# Patient Record
Sex: FEMALE | Race: WHITE | NOT HISPANIC OR LATINO
[De-identification: names, ages, dates, MRNs, and addresses within clinical notes are randomized per-mention and may not be internally consistent; named-entity substitution may affect disease eponyms.]

---

## 2021-02-12 PROBLEM — Z00.00 ENCOUNTER FOR PREVENTIVE HEALTH EXAMINATION: Status: ACTIVE | Noted: 2021-02-12

## 2021-03-04 ENCOUNTER — APPOINTMENT (OUTPATIENT)
Dept: OBGYN | Facility: CLINIC | Age: 41
End: 2021-03-04

## 2021-03-04 ENCOUNTER — APPOINTMENT (OUTPATIENT)
Dept: OBGYN | Facility: CLINIC | Age: 41
End: 2021-03-04
Payer: COMMERCIAL

## 2021-03-04 DIAGNOSIS — N64.89 OTHER SPECIFIED DISORDERS OF BREAST: ICD-10-CM

## 2021-03-04 DIAGNOSIS — N64.52 NIPPLE DISCHARGE: ICD-10-CM

## 2021-03-04 DIAGNOSIS — Z01.419 ENCOUNTER FOR GYNECOLOGICAL EXAMINATION (GENERAL) (ROUTINE) W/OUT ABNORMAL FINDINGS: ICD-10-CM

## 2021-03-04 DIAGNOSIS — R92.2 INCONCLUSIVE MAMMOGRAM: ICD-10-CM

## 2021-03-04 PROCEDURE — 36415 COLL VENOUS BLD VENIPUNCTURE: CPT

## 2021-03-04 PROCEDURE — 99072 ADDL SUPL MATRL&STAF TM PHE: CPT

## 2021-03-04 PROCEDURE — 99385 PREV VISIT NEW AGE 18-39: CPT

## 2021-03-04 PROCEDURE — 99202 OFFICE O/P NEW SF 15 MIN: CPT | Mod: 25

## 2021-03-06 LAB
BASOPHILS # BLD AUTO: 0.06 K/UL
BASOPHILS NFR BLD AUTO: 0.8 %
EOSINOPHIL # BLD AUTO: 0.12 K/UL
EOSINOPHIL NFR BLD AUTO: 1.6 %
FSH SERPL-MCNC: 3.7 IU/L
HCT VFR BLD CALC: 44.2 %
HGB BLD-MCNC: 13.8 G/DL
HPV HIGH+LOW RISK DNA PNL CVX: NOT DETECTED
IMM GRANULOCYTES NFR BLD AUTO: 0.3 %
LH SERPL-ACNC: 3.1 IU/L
LYMPHOCYTES # BLD AUTO: 2.5 K/UL
LYMPHOCYTES NFR BLD AUTO: 32.6 %
MAN DIFF?: NORMAL
MCHC RBC-ENTMCNC: 30.1 PG
MCHC RBC-ENTMCNC: 31.2 GM/DL
MCV RBC AUTO: 96.5 FL
MONOCYTES # BLD AUTO: 0.62 K/UL
MONOCYTES NFR BLD AUTO: 8.1 %
NEUTROPHILS # BLD AUTO: 4.34 K/UL
NEUTROPHILS NFR BLD AUTO: 56.6 %
PLATELET # BLD AUTO: 313 K/UL
PROLACTIN SERPL-MCNC: 13 NG/ML
RBC # BLD: 4.58 M/UL
RBC # FLD: 13.2 %
TSH SERPL-ACNC: 0.95 UIU/ML
WBC # FLD AUTO: 7.66 K/UL

## 2021-03-06 NOTE — END OF VISIT
[FreeTextEntry3] : I, Maru Earl, solely acted as a scribe for Dr. Shaina Nice on 03/04/2021  . All medical entries made by the Scribe were at my, Dr. Nice, direction and personally dictated by me on 03/04/2021  . I have reviewed the chart and agree that the record accurately reflects my personal performance of the history, physical exam, assessment and plan. I have also personally directed, reviewed and agreed with the chart.

## 2021-03-06 NOTE — HISTORY OF PRESENT ILLNESS
[N] : Patient reports normal menses [No] : Patient does not have concerns regarding sex [Currently Active] : currently active [Men] : men [Y] : Positive pregnancy history [Menarche Age: ____] : age at menarche was [unfilled] [Yes] : Yes [Condoms] : Condoms [TextBox_4] : New pt presents for an annual exam [PapSmeardate] : 2018 [TextBox_31] : AS PER PT [LMPDate] : 02/11/2021 [PGxTotal] : 1 [[facility]xFulerm] : 1 [[facility]] : 2 [PGHxMultBirths] : 1 [FreeTextEntry1] : 02/11/2021

## 2021-03-06 NOTE — DISCUSSION/SUMMARY
[FreeTextEntry1] : Pap done today. Declines birth control. Declines STI testing.\par \par Physical exam significant for bilateral nipple clear discharge with crusting-- Blood work ordered to evaluate hormone levels. Referral for breast specialist given. Prescription for mammogram screening and breast US given. \par \par Recommended to consult with cardiologist for heart palpitations. \par \par Self-breast exam reviewed. \par \par She will follow up annually and as needed.

## 2021-03-06 NOTE — REVIEW OF SYSTEMS
[Negative] : Heme/Lymph [Palpitations] : palpitations [Patient Intake Form Reviewed] : Patient intake form was reviewed

## 2021-03-06 NOTE — PHYSICAL EXAM
[Appropriately responsive] : appropriately responsive [Alert] : alert [No Acute Distress] : no acute distress [Soft] : soft [Non-tender] : non-tender [Non-distended] : non-distended [Oriented x3] : oriented x3 [Examination Of The Breasts] : a normal appearance [No Masses] : no breast masses were palpable [Labia Majora] : normal [Labia Minora] : normal [No Bleeding] : There was no active vaginal bleeding [Normal] : normal [Uterine Adnexae] : normal [Nipple  Discharge] : nipple discharge [FreeTextEntry6] : Bilateral nipple clear discharge and crusting

## 2021-03-09 LAB — CYTOLOGY CVX/VAG DOC THIN PREP: NORMAL

## 2021-03-23 ENCOUNTER — RESULT REVIEW (OUTPATIENT)
Age: 41
End: 2021-03-23

## 2021-03-23 ENCOUNTER — APPOINTMENT (OUTPATIENT)
Dept: ULTRASOUND IMAGING | Facility: CLINIC | Age: 41
End: 2021-03-23
Payer: COMMERCIAL

## 2021-03-23 ENCOUNTER — APPOINTMENT (OUTPATIENT)
Dept: MAMMOGRAPHY | Facility: CLINIC | Age: 41
End: 2021-03-23
Payer: COMMERCIAL

## 2021-03-23 ENCOUNTER — TRANSCRIPTION ENCOUNTER (OUTPATIENT)
Age: 41
End: 2021-03-23

## 2021-03-23 ENCOUNTER — OUTPATIENT (OUTPATIENT)
Dept: OUTPATIENT SERVICES | Facility: HOSPITAL | Age: 41
LOS: 1 days | End: 2021-03-23
Payer: COMMERCIAL

## 2021-03-23 ENCOUNTER — NON-APPOINTMENT (OUTPATIENT)
Age: 41
End: 2021-03-23

## 2021-03-23 DIAGNOSIS — Z00.00 ENCOUNTER FOR GENERAL ADULT MEDICAL EXAMINATION WITHOUT ABNORMAL FINDINGS: ICD-10-CM

## 2021-03-23 PROCEDURE — 76641 ULTRASOUND BREAST COMPLETE: CPT | Mod: 26,50

## 2021-03-23 PROCEDURE — 77063 BREAST TOMOSYNTHESIS BI: CPT | Mod: 26

## 2021-03-23 PROCEDURE — 76641 ULTRASOUND BREAST COMPLETE: CPT

## 2021-03-23 PROCEDURE — 77067 SCR MAMMO BI INCL CAD: CPT

## 2021-03-23 PROCEDURE — 77067 SCR MAMMO BI INCL CAD: CPT | Mod: 26

## 2021-03-23 PROCEDURE — 77063 BREAST TOMOSYNTHESIS BI: CPT

## 2021-03-25 ENCOUNTER — NON-APPOINTMENT (OUTPATIENT)
Age: 41
End: 2021-03-25

## 2022-08-22 ENCOUNTER — OFFICE (OUTPATIENT)
Dept: URBAN - METROPOLITAN AREA CLINIC 115 | Facility: CLINIC | Age: 42
Setting detail: OPHTHALMOLOGY
End: 2022-08-22
Payer: COMMERCIAL

## 2022-08-22 DIAGNOSIS — H16.222: ICD-10-CM

## 2022-08-22 DIAGNOSIS — H11.153: ICD-10-CM

## 2022-08-22 DIAGNOSIS — H16.221: ICD-10-CM

## 2022-08-22 PROCEDURE — 83861 MICROFLUID ANALY TEARS: CPT | Performed by: OPHTHALMOLOGY

## 2022-08-22 PROCEDURE — 92014 COMPRE OPH EXAM EST PT 1/>: CPT | Performed by: OPHTHALMOLOGY

## 2022-08-22 ASSESSMENT — REFRACTION_AUTOREFRACTION
OD_CYLINDER: -0.50
OS_CYLINDER: -0.75
OD_AXIS: 025
OS_SPHERE: +0.25
OD_SPHERE: 0.00
OS_AXIS: 132

## 2022-08-22 ASSESSMENT — VISUAL ACUITY
OD_BCVA: 20/20
OS_BCVA: 20/20

## 2022-08-22 ASSESSMENT — SPHEQUIV_DERIVED
OS_SPHEQUIV: -0.125
OD_SPHEQUIV: -0.25

## 2022-08-22 ASSESSMENT — TONOMETRY
OD_IOP_MMHG: 16
OS_IOP_MMHG: 15

## 2023-06-24 ENCOUNTER — NON-APPOINTMENT (OUTPATIENT)
Age: 43
End: 2023-06-24

## 2023-07-12 ENCOUNTER — OUTPATIENT (OUTPATIENT)
Dept: OUTPATIENT SERVICES | Facility: HOSPITAL | Age: 43
LOS: 1 days | End: 2023-07-12
Payer: COMMERCIAL

## 2023-07-12 ENCOUNTER — APPOINTMENT (OUTPATIENT)
Dept: ULTRASOUND IMAGING | Facility: CLINIC | Age: 43
End: 2023-07-12
Payer: COMMERCIAL

## 2023-07-12 ENCOUNTER — APPOINTMENT (OUTPATIENT)
Dept: MAMMOGRAPHY | Facility: CLINIC | Age: 43
End: 2023-07-12
Payer: COMMERCIAL

## 2023-07-12 DIAGNOSIS — Z00.8 ENCOUNTER FOR OTHER GENERAL EXAMINATION: ICD-10-CM

## 2023-07-12 PROCEDURE — 77067 SCR MAMMO BI INCL CAD: CPT | Mod: 26

## 2023-07-12 PROCEDURE — 77063 BREAST TOMOSYNTHESIS BI: CPT | Mod: 26

## 2023-07-12 PROCEDURE — 76641 ULTRASOUND BREAST COMPLETE: CPT | Mod: 26,50

## 2023-07-12 PROCEDURE — 76641 ULTRASOUND BREAST COMPLETE: CPT

## 2023-07-12 PROCEDURE — 77067 SCR MAMMO BI INCL CAD: CPT

## 2023-07-12 PROCEDURE — 77063 BREAST TOMOSYNTHESIS BI: CPT

## 2023-09-21 ENCOUNTER — RESULT REVIEW (OUTPATIENT)
Age: 43
End: 2023-09-21

## 2023-09-21 ENCOUNTER — OUTPATIENT (OUTPATIENT)
Dept: OUTPATIENT SERVICES | Facility: HOSPITAL | Age: 43
LOS: 1 days | End: 2023-09-21
Payer: COMMERCIAL

## 2023-09-21 ENCOUNTER — APPOINTMENT (OUTPATIENT)
Dept: MRI IMAGING | Facility: CLINIC | Age: 43
End: 2023-09-21
Payer: COMMERCIAL

## 2023-09-21 DIAGNOSIS — Z00.8 ENCOUNTER FOR OTHER GENERAL EXAMINATION: ICD-10-CM

## 2023-09-21 PROCEDURE — A9585: CPT

## 2023-09-21 PROCEDURE — C8937: CPT

## 2023-09-21 PROCEDURE — 77049 MRI BREAST C-+ W/CAD BI: CPT | Mod: 26

## 2023-09-21 PROCEDURE — C8908: CPT

## 2024-04-03 ENCOUNTER — NON-APPOINTMENT (OUTPATIENT)
Age: 44
End: 2024-04-03

## 2024-04-11 ENCOUNTER — APPOINTMENT (OUTPATIENT)
Dept: DERMATOLOGY | Facility: CLINIC | Age: 44
End: 2024-04-11
Payer: COMMERCIAL

## 2024-04-11 PROCEDURE — 11102 TANGNTL BX SKIN SINGLE LES: CPT

## 2024-04-11 PROCEDURE — 99203 OFFICE O/P NEW LOW 30 MIN: CPT | Mod: 25

## 2024-05-16 ENCOUNTER — APPOINTMENT (OUTPATIENT)
Dept: DERMATOLOGY | Facility: CLINIC | Age: 44
End: 2024-05-16

## 2024-07-29 ENCOUNTER — APPOINTMENT (OUTPATIENT)
Dept: THORACIC SURGERY | Facility: CLINIC | Age: 44
End: 2024-07-29
Payer: COMMERCIAL

## 2024-07-29 VITALS
BODY MASS INDEX: 23.54 KG/M2 | OXYGEN SATURATION: 100 % | HEART RATE: 61 BPM | SYSTOLIC BLOOD PRESSURE: 113 MMHG | DIASTOLIC BLOOD PRESSURE: 61 MMHG | WEIGHT: 150 LBS | TEMPERATURE: 97.52 F | RESPIRATION RATE: 16 BRPM | HEIGHT: 67 IN

## 2024-07-29 DIAGNOSIS — Z87.891 PERSONAL HISTORY OF NICOTINE DEPENDENCE: ICD-10-CM

## 2024-07-29 DIAGNOSIS — Z78.9 OTHER SPECIFIED HEALTH STATUS: ICD-10-CM

## 2024-07-29 DIAGNOSIS — R22.2 LOCALIZED SWELLING, MASS AND LUMP, TRUNK: ICD-10-CM

## 2024-07-29 DIAGNOSIS — Z82.49 FAMILY HISTORY OF ISCHEMIC HEART DISEASE AND OTHER DISEASES OF THE CIRCULATORY SYSTEM: ICD-10-CM

## 2024-07-29 PROCEDURE — 99204 OFFICE O/P NEW MOD 45 MIN: CPT

## 2024-07-29 RX ORDER — ACETAMINOPHEN 325 MG
TABLET ORAL
Refills: 0 | Status: ACTIVE | COMMUNITY

## 2024-07-29 RX ORDER — BACILLUS COAGULANS/INULIN 1B-250 MG
CAPSULE ORAL
Refills: 0 | Status: ACTIVE | COMMUNITY

## 2024-07-29 RX ORDER — TIRZEPATIDE 2.5 MG/.5ML
2.5 INJECTION, SOLUTION SUBCUTANEOUS
Refills: 0 | Status: ACTIVE | COMMUNITY

## 2024-07-29 NOTE — ASSESSMENT
[FreeTextEntry1] : Radha is a 44-year-old female with a recent intentional weight loss now noticing a prominence in the left upper parasternal region of unclear etiology.  On exam this may simply be prominent costochondral junction.  CT scan is negative for any abnormality and I am recommending an MRI to ensure no pathology exists.  I look forward to seeing her after that is complete.  Thank you for allowing me to participate in the care of your patient.  45 minutes was spent during this encounter.  Stanislaw Dorantes MD Department of Cardiovascular and Thoracic Surgery  [employer] at [facility]

## 2024-07-29 NOTE — DATA REVIEWED
[FreeTextEntry1] : CT CHEST WITHOUT CONTRAST performed through [facility] on 6/28/24: Note - This patient has received 1 CT studies and 0 Myocardial Perfusion studies within our network over the previous 12 month period.  HISTORY:  Lump left chest. TECHNIQUE: CT of the chest is performed. Contrast Technique: Without  Range/Planes: [place] of lungs to the adrenal glands. Axial, coronal, and sagittal.  One or more of the following dose reduction techniques were used: automated exposure control, adjustment of the mA and/or kV according to patient size, use of iterative reconstruction technique.   COMPARISON:  None.  FINDINGS: CHEST  THYROID: Visualized portion is unremarkable.  LUNGS: There is an air cyst in the right middle lobe.  TRACHEA AND BRONCHI: Unremarkable.  PLEURA: Unremarkable  HEART AND PERICARDIUM: Unremarkable.  VASCULATURE: Unremarkable.  LYMPH NODES AND MEDIASTINUM: Unremarkable.  SOFT TISSUES: Unremarkable.  BONES: Unremarkable.  FINDINGS: VISUALIZED PORTION OF THE ABDOMEN  GASTROESOPHAGEAL JUNCTION: Unremarkable. OTHER ORGANS: Unremarkable.  IMPRESSION:   Solitary air cyst in the right middle lobe. No left chest wall masses are identified.  Thank you for the opportunity to participate in the care of this patient.     ALEXX GARCÍA MD  - Electronically Signed: 07- 1:39 PM        CT CHEST WITHOUT CONTRAST performed through [facility] on 9/29/23: Note - This patient has received 0 CT studies and 0 Myocardial Perfusion studies within our network over the previous 12 month period. HISTORY:  1.3 cm nodular density in the left apical region on the chest x-ray of 9/19/2023 TECHNIQUE: CT of the chest is performed. Contrast Technique: Without  Range/Planes: [place] of lungs to the adrenal glands. Axial, coronal, and sagittal.  One or more of the following dose reduction techniques were used: automated exposure control, adjustment of the mA and/or kV according to patient size, use of iterative reconstruction technique.   COMPARISON:  None  FINDINGS: CHEST  THYROID: Visualized portion is unremarkable.  LUNGS: No infiltrates are identified. Mild scarring at both lung bases. No nodules are identified.  TRACHEA AND BRONCHI: Unremarkable.  PLEURA: There are no pleural effusions identified. No pleural-based masses.  HEART AND PERICARDIUM: Normal size without pericardial effusion  VASCULATURE: Unremarkable.  LYMPH NODES AND MEDIASTINUM: Unremarkable.  SOFT TISSUES: Unremarkable.  BONES: Unremarkable.  FINDINGS: VISUALIZED PORTION OF THE ABDOMEN  GASTROESOPHAGEAL JUNCTION: Unremarkable. OTHER ORGANS: Unremarkable.  IMPRESSION:   1. No evidence of pulmonary nodules 3. Mild scarring at both lung bases.  Thank you for the opportunity to participate in the care of this patient.     RIC STOVALL MD  - Electronically Signed: 10- 9:52 AM

## 2024-07-29 NOTE — DATA REVIEWED
[FreeTextEntry1] : CT CHEST WITHOUT CONTRAST performed through [facility] on 6/28/24: Note - This patient has received 1 CT studies and 0 Myocardial Perfusion studies within our network over the previous 12 month period.  HISTORY:  Lump left chest. TECHNIQUE: CT of the chest is performed. Contrast Technique: Without  Range/Planes: [place] of lungs to the adrenal glands. Axial, coronal, and sagittal.  One or more of the following dose reduction techniques were used: automated exposure control, adjustment of the mA and/or kV according to patient size, use of iterative reconstruction technique.   COMPARISON:  None.  FINDINGS: CHEST  THYROID: Visualized portion is unremarkable.  LUNGS: There is an air cyst in the right middle lobe.  TRACHEA AND BRONCHI: Unremarkable.  PLEURA: Unremarkable  HEART AND PERICARDIUM: Unremarkable.  VASCULATURE: Unremarkable.  LYMPH NODES AND MEDIASTINUM: Unremarkable.  SOFT TISSUES: Unremarkable.  BONES: Unremarkable.  FINDINGS: VISUALIZED PORTION OF THE ABDOMEN  GASTROESOPHAGEAL JUNCTION: Unremarkable. OTHER ORGANS: Unremarkable.  IMPRESSION:   Solitary air cyst in the right middle lobe. No left chest wall masses are identified.  Thank you for the opportunity to participate in the care of this patient.     ALEXX GARCÍA MD  - Electronically Signed: 07- 1:39 PM        CT CHEST WITHOUT CONTRAST performed through [facility] on 9/29/23: Note - This patient has received 0 CT studies and 0 Myocardial Perfusion studies within our network over the previous 12 month period. HISTORY:  1.3 cm nodular density in the left apical region on the chest x-ray of 9/19/2023 TECHNIQUE: CT of the chest is performed. Contrast Technique: Without  Range/Planes: [place] of lungs to the adrenal glands. Axial, coronal, and sagittal.  One or more of the following dose reduction techniques were used: automated exposure control, adjustment of the mA and/or kV according to patient size, use of iterative reconstruction technique.   COMPARISON:  None  FINDINGS: CHEST  THYROID: Visualized portion is unremarkable.  LUNGS: No infiltrates are identified. Mild scarring at both lung bases. No nodules are identified.  TRACHEA AND BRONCHI: Unremarkable.  PLEURA: There are no pleural effusions identified. No pleural-based masses.  HEART AND PERICARDIUM: Normal size without pericardial effusion  VASCULATURE: Unremarkable.  LYMPH NODES AND MEDIASTINUM: Unremarkable.  SOFT TISSUES: Unremarkable.  BONES: Unremarkable.  FINDINGS: VISUALIZED PORTION OF THE ABDOMEN  GASTROESOPHAGEAL JUNCTION: Unremarkable. OTHER ORGANS: Unremarkable.  IMPRESSION:   1. No evidence of pulmonary nodules 3. Mild scarring at both lung bases.  Thank you for the opportunity to participate in the care of this patient.     RIC STOVALL MD  - Electronically Signed: 10- 9:52 AM

## 2024-07-29 NOTE — HISTORY OF PRESENT ILLNESS
[FreeTextEntry1] : Ms. ANNA REYNOLDS is a 44-year-old female, referred by Dr. Lombardi, who presents for consultation regarding complaints of a lump above her left breast that is hard and non-painful.   Past medical history includes hyperlipidemia.  Ms. Reynolds reports that she feels a palpable mass on her left chest wall that has been present for approximately the past 2 months. She denies chest pain, palpitations, shortness of breath, cough, dysphagia, nausea/vomiting, fevers, chills, fatigue, unintentional weight loss or gain, and night sweats.

## 2024-07-29 NOTE — REVIEW OF SYSTEMS
[Recent Weight Loss (___ Lbs)] : recent [unfilled] ~Ulb weight loss [As Noted in HPI] : as noted in HPI [Negative] : Heme/Lymph [Fever] : no fever [Chills] : no chills [Feeling Poorly] : not feeling poorly [Feeling Tired] : not feeling tired [Chest Pain] : no chest pain [Palpitations] : no palpitations [Lower Ext Edema] : no lower extremity edema [Shortness Of Breath] : no shortness of breath [Wheezing] : no wheezing [Cough] : no cough [SOB on Exertion] : no shortness of breath during exertion

## 2024-07-29 NOTE — ASSESSMENT
[FreeTextEntry1] : Radha is a 44-year-old female with a recent intentional weight loss now noticing a prominence in the left upper parasternal region of unclear etiology.  On exam this may simply be prominent costochondral junction.  CT scan is negative for any abnormality and I am recommending an MRI to ensure no pathology exists.  I look forward to seeing her after that is complete.  Thank you for allowing me to participate in the care of your patient.  45 minutes was spent during this encounter.  Stanislaw Dorantes MD Department of Cardiovascular and Thoracic Surgery  [employer] at [facility]

## 2024-08-01 PROBLEM — R22.2 NODULE OF CHEST WALL: Status: ACTIVE | Noted: 2024-08-01

## 2024-09-16 ENCOUNTER — APPOINTMENT (OUTPATIENT)
Dept: THORACIC SURGERY | Facility: CLINIC | Age: 44
End: 2024-09-16
Payer: COMMERCIAL

## 2024-09-16 VITALS
SYSTOLIC BLOOD PRESSURE: 109 MMHG | BODY MASS INDEX: 22.76 KG/M2 | HEIGHT: 67 IN | HEART RATE: 69 BPM | DIASTOLIC BLOOD PRESSURE: 70 MMHG | WEIGHT: 145 LBS | RESPIRATION RATE: 16 BRPM | OXYGEN SATURATION: 98 %

## 2024-09-16 DIAGNOSIS — R22.2 LOCALIZED SWELLING, MASS AND LUMP, TRUNK: ICD-10-CM

## 2024-09-16 DIAGNOSIS — Z87.891 PERSONAL HISTORY OF NICOTINE DEPENDENCE: ICD-10-CM

## 2024-09-16 DIAGNOSIS — J90 PLEURAL EFFUSION, NOT ELSEWHERE CLASSIFIED: ICD-10-CM

## 2024-09-16 PROCEDURE — 99214 OFFICE O/P EST MOD 30 MIN: CPT

## 2024-09-16 NOTE — HISTORY OF PRESENT ILLNESS
[FreeTextEntry1] : Ms. ANNA REYNOLDS is a 44-year-old female, referred by Dr. Lombardi, who presents for a follow up appointment regarding complaints of a lump above her left breast that is hard and non-painful. Past medical history includes hyperlipidemia.  9/29/23 CT chest without contrast at [facility]: - No evidence of pulmonary nodules - Mild scarring at both lung bases.  6/28/24 CT chest without contrast at [facility]:  - Solitary air cyst in the right middle lobe - No left chest wall masses are identified  8/6/24 MRI chest without contrast at [facility]: - No evidence for left chest wall mass - Small bilateral pleural effusions.  She presents today to review MRI chest imaging results. She is overall well. SHe had lost 40 lbs and noticed a limp.

## 2024-09-16 NOTE — HISTORY OF PRESENT ILLNESS
[FreeTextEntry1] : Ms. ANNA REYNOLDS is a 44-year-old female, referred by Dr. Lombardi, who presents for a follow up appointment regarding complaints of a lump above her left breast that is hard and non-painful. Past medical history includes hyperlipidemia.  9/29/23 CT chest without contrast at [facility]: - No evidence of pulmonary nodules - Mild scarring at both lung bases.  6/28/24 CT chest without contrast at [facility]:  - Solitary air cyst in the right middle lobe - No left chest wall masses are identified  8/6/24 MRI chest without contrast at [facility]: - No evidence for left chest wall mass - Small bilateral pleural effusions.  She presents today to review MRI chest imaging results. She is overall well. SHe had lost 40 lbs and noticed a limp.

## 2024-09-16 NOTE — DATA REVIEWED
Patient/Caregiver provided printed discharge information. [FreeTextEntry1] : Independent review of imaging and independent interpretation was performed at today's visit: 8/6/24 MRI chest without contrast

## 2024-09-16 NOTE — ASSESSMENT
[FreeTextEntry1] : Radha is a 44-year-old female with a recent complaint of a left anterior chest wall nodule.  A CT scan and now MRI have been completed with no evidence of abnormality.  This may simply represent a prominent joint.  There was mention of pleural effusions on the MRI although I do not appreciate them on my review but based on the above I have recommended a follow-up CT in 6 months time.  Thank you for allowing me to participate in the care of your patient.  30 minutes was spent during this encounter.  Stanislaw Dorantes MD Department of Cardiovascular and Thoracic Surgery  [employer] at [facility]

## 2024-09-16 NOTE — DATA REVIEWED
[FreeTextEntry1] : Independent review of imaging and independent interpretation was performed at today's visit: 8/6/24 MRI chest without contrast

## 2024-09-16 NOTE — PHYSICAL EXAM
[] : no respiratory distress [Auscultation Breath Sounds / Voice Sounds] : lungs were clear to auscultation bilaterally [Heart Rate And Rhythm] : heart rate was normal and rhythm regular [Heart Sounds] : normal S1 and S2 [Heart Sounds Gallop] : no gallops [Murmurs] : no murmurs [Heart Sounds Pericardial Friction Rub] : no pericardial rub [Examination Of The Chest] : the chest was normal in appearance [Chest Visual Inspection Thoracic Asymmetry] : no chest asymmetry [Diminished Respiratory Excursion] : normal chest expansion [Deep Tendon Reflexes (DTR)] : deep tendon reflexes were 2+ and symmetric [Sensation] : the sensory exam was normal to light touch and pinprick [No Focal Deficits] : no focal deficits [Oriented To Time, Place, And Person] : oriented to person, place, and time [Impaired Insight] : insight and judgment were intact [Affect] : the affect was normal

## 2024-09-16 NOTE — ASSESSMENT
[FreeTextEntry1] : Radha is a 44-year-old female with a recent complaint of a left anterior chest wall nodule.  A CT scan and now MRI have been completed with no evidence of abnormality.  This may simply represent a prominent joint.  There was mention of pleural effusions on the MRI although I do not appreciate them on my review but based on the above I have recommended a follow-up CT in 6 months time.  Thank you for allowing me to participate in the care of your patient.  30 minutes was spent during this encounter.  Stanislaw Dorantes MD Department of Cardiovascular and Thoracic Surgery  [employer] at [facility]

## 2024-09-16 NOTE — REVIEW OF SYSTEMS
[Negative] : Heme/Lymph [Feeling Poorly] : not feeling poorly [Feeling Tired] : not feeling tired [Chest Pain] : no chest pain [FreeTextEntry9] : left antreior chest wall mass

## 2024-09-17 PROBLEM — J90 PLEURAL EFFUSION, BILATERAL: Status: ACTIVE | Noted: 2024-09-17

## 2024-12-12 ENCOUNTER — APPOINTMENT (OUTPATIENT)
Dept: DERMATOLOGY | Facility: CLINIC | Age: 44
End: 2024-12-12
Payer: COMMERCIAL

## 2024-12-12 PROCEDURE — 99213 OFFICE O/P EST LOW 20 MIN: CPT

## 2025-03-31 ENCOUNTER — APPOINTMENT (OUTPATIENT)
Dept: THORACIC SURGERY | Facility: CLINIC | Age: 45
End: 2025-03-31
Payer: COMMERCIAL

## 2025-03-31 VITALS
WEIGHT: 150 LBS | HEART RATE: 56 BPM | OXYGEN SATURATION: 100 % | DIASTOLIC BLOOD PRESSURE: 81 MMHG | SYSTOLIC BLOOD PRESSURE: 119 MMHG | HEIGHT: 67 IN | RESPIRATION RATE: 16 BRPM | BODY MASS INDEX: 23.54 KG/M2 | TEMPERATURE: 98 F

## 2025-03-31 DIAGNOSIS — Z87.891 PERSONAL HISTORY OF NICOTINE DEPENDENCE: ICD-10-CM

## 2025-03-31 DIAGNOSIS — J98.4 OTHER DISORDERS OF LUNG: ICD-10-CM

## 2025-03-31 DIAGNOSIS — R22.2 LOCALIZED SWELLING, MASS AND LUMP, TRUNK: ICD-10-CM

## 2025-03-31 PROCEDURE — 99214 OFFICE O/P EST MOD 30 MIN: CPT

## 2025-03-31 RX ORDER — DOXYCYCLINE HYCLATE 100 MG/1
100 CAPSULE ORAL
Qty: 14 | Refills: 0 | Status: COMPLETED | COMMUNITY
Start: 2025-02-13

## 2025-03-31 RX ORDER — SULFAMETHOXAZOLE AND TRIMETHOPRIM 800; 160 MG/1; MG/1
800-160 TABLET ORAL
Qty: 10 | Refills: 0 | Status: COMPLETED | COMMUNITY
Start: 2025-02-26

## 2025-03-31 RX ORDER — PNV NO.95/FERROUS FUM/FOLIC AC 28MG-0.8MG
TABLET ORAL
Refills: 0 | Status: ACTIVE | COMMUNITY